# Patient Record
Sex: FEMALE | Race: WHITE | Employment: PART TIME | URBAN - METROPOLITAN AREA
[De-identification: names, ages, dates, MRNs, and addresses within clinical notes are randomized per-mention and may not be internally consistent; named-entity substitution may affect disease eponyms.]

---

## 2018-07-30 ENCOUNTER — OFFICE VISIT (OUTPATIENT)
Dept: PRIMARY CARE CLINIC | Age: 33
End: 2018-07-30

## 2018-07-30 VITALS
SYSTOLIC BLOOD PRESSURE: 124 MMHG | HEIGHT: 65 IN | TEMPERATURE: 99 F | RESPIRATION RATE: 12 BRPM | DIASTOLIC BLOOD PRESSURE: 84 MMHG | WEIGHT: 195.8 LBS | BODY MASS INDEX: 32.62 KG/M2 | HEART RATE: 86 BPM | OXYGEN SATURATION: 98 %

## 2018-07-30 DIAGNOSIS — M23.91 ACUTE INTERNAL DERANGEMENT OF RIGHT KNEE: Primary | ICD-10-CM

## 2018-07-30 PROCEDURE — 99203 OFFICE O/P NEW LOW 30 MIN: CPT | Performed by: NURSE PRACTITIONER

## 2018-07-30 PROCEDURE — G8417 CALC BMI ABV UP PARAM F/U: HCPCS | Performed by: NURSE PRACTITIONER

## 2018-07-30 PROCEDURE — 4004F PT TOBACCO SCREEN RCVD TLK: CPT | Performed by: NURSE PRACTITIONER

## 2018-07-30 PROCEDURE — G8427 DOCREV CUR MEDS BY ELIG CLIN: HCPCS | Performed by: NURSE PRACTITIONER

## 2018-07-30 RX ORDER — ALBUTEROL SULFATE 90 UG/1
2 AEROSOL, METERED RESPIRATORY (INHALATION)
COMMUNITY
End: 2018-09-11 | Stop reason: SDUPTHER

## 2018-07-30 RX ORDER — NAPROXEN 500 MG/1
500 TABLET ORAL 2 TIMES DAILY WITH MEALS
Qty: 20 TABLET | Refills: 0 | Status: SHIPPED | OUTPATIENT
Start: 2018-07-30 | End: 2018-09-11

## 2018-07-30 ASSESSMENT — PATIENT HEALTH QUESTIONNAIRE - PHQ9
SUM OF ALL RESPONSES TO PHQ QUESTIONS 1-9: 0
2. FEELING DOWN, DEPRESSED OR HOPELESS: 0
1. LITTLE INTEREST OR PLEASURE IN DOING THINGS: 0
SUM OF ALL RESPONSES TO PHQ9 QUESTIONS 1 & 2: 0

## 2018-07-30 NOTE — PROGRESS NOTES
CHIEF COMPLAINT    Chief Complaint   Patient presents with    Leg Pain     right leg pain/swelling/popping in knee x 3 days due to a fall. has taken tyl/ibu with relief.  Fall       HPI    Shannan Proctor is a 28 y.o. female who presents with Right knee pain. Mechanism of injury was She was standing on a garbage can looking at her birds and S and the can gave out. She did not land on her knee but hyperextended the knee. .  The onset of the pain was 2 days ago. The duration has been constant since the onset. The severity of the pain is 8/10. The pain worsens with ambulation. Patient also works as a  so she is up on her feet all day long. She denies hitting her head no other injuries. She's been using Tylenol and ibuprofen with minimal relief. REVIEW OF SYSTEMS    General: No fever or chills  Skin: No Rashes or redness of the skin  : No dysuria or hematuria  See HPI for further details. PAST MEDICAL & SURGICAL HISTORY    Past Medical History:   Diagnosis Date    Asthma      Past Surgical History:   Procedure Laterality Date    FOOT SURGERY Right 2010       CURRENT MEDICATIONS    Current Outpatient Prescriptions   Medication Sig Dispense Refill    Loratadine (CLARITIN PO) Take by mouth as needed (allergies)      Multiple Vitamins-Minerals (MULTIVITAMIN WOMEN PO) Take by mouth daily      Biotin w/ Vitamins C & E (HAIR/SKIN/NAILS PO) Take by mouth daily      albuterol sulfate  (90 Base) MCG/ACT inhaler Inhale 2 puffs into the lungs      naproxen (NAPROSYN) 500 MG tablet Take 1 tablet by mouth 2 times daily (with meals) for 10 days 20 tablet 0     No current facility-administered medications for this visit.         ALLERGIES    No Known Allergies    SOCIAL & FAMILY HISTORY    Social History     Social History    Marital status:      Spouse name: N/A    Number of children: N/A    Years of education: N/A     Social History Main Topics    Smoking status: Current Some Day Smoker

## 2018-07-30 NOTE — PATIENT INSTRUCTIONS
Patient Education        Knee Sprain: Care Instructions  Your Care Instructions    A knee sprain is one or more stretched, partly torn, or completely torn knee ligaments. Ligaments are bands of ropelike tissue that connect bone to bone and make the knee stable. The knee has four main ligaments. Knee sprains often happen because of a twisting or bending injury from sports such as skiing, basketball, soccer, or football. The knee turns one way while the lower or upper leg goes another way. A sprain also can happen when the knee is hit from the side or the front. If a knee ligament is slightly stretched, you will probably need only home treatment. You may need a splint or brace (immobilizer) for a partly torn ligament. A complete tear may need surgery. A minor knee sprain may take up to 6 weeks to heal, while a severe sprain may take months. Follow-up care is a key part of your treatment and safety. Be sure to make and go to all appointments, and call your doctor if you are having problems. It's also a good idea to know your test results and keep a list of the medicines you take. How can you care for yourself at home? · Follow instructions about how much weight you can put on your leg and how to walk with crutches. · Prop up your leg on a pillow when you ice it or anytime you sit or lie down for the next 3 days. Try to keep it above the level of your heart. This will help reduce swelling. · Put ice or a cold pack on your knee for 10 to 20 minutes at a time. Try to do this every 1 to 2 hours for the next 3 days (when you are awake) or until the swelling goes down. Put a thin cloth between the ice and your skin. Do not get the splint wet. · If you have an elastic bandage, make sure it is snug but not so tight that your leg is numb, tingles, or swells below the bandage. You can loosen the bandage if it is too tight. · Your doctor may recommend a brace (immobilizer) to support your knee while it heals.  Wear it

## 2018-07-31 ENCOUNTER — TELEPHONE (OUTPATIENT)
Dept: ORTHOPEDIC SURGERY | Age: 33
End: 2018-07-31

## 2018-07-31 ENCOUNTER — OFFICE VISIT (OUTPATIENT)
Dept: ORTHOPEDIC SURGERY | Age: 33
End: 2018-07-31

## 2018-07-31 VITALS
WEIGHT: 195 LBS | DIASTOLIC BLOOD PRESSURE: 81 MMHG | HEIGHT: 65 IN | BODY MASS INDEX: 32.49 KG/M2 | SYSTOLIC BLOOD PRESSURE: 126 MMHG | HEART RATE: 78 BPM

## 2018-07-31 DIAGNOSIS — M25.061 HEMARTHROSIS OF RIGHT KNEE: Primary | ICD-10-CM

## 2018-07-31 DIAGNOSIS — M25.561 ACUTE PAIN OF RIGHT KNEE: ICD-10-CM

## 2018-07-31 DIAGNOSIS — S83.411A SPRAIN OF MEDIAL COLLATERAL LIGAMENT OF RIGHT KNEE, INITIAL ENCOUNTER: ICD-10-CM

## 2018-07-31 PROCEDURE — 20610 DRAIN/INJ JOINT/BURSA W/O US: CPT | Performed by: ORTHOPAEDIC SURGERY

## 2018-07-31 PROCEDURE — G8417 CALC BMI ABV UP PARAM F/U: HCPCS | Performed by: ORTHOPAEDIC SURGERY

## 2018-07-31 PROCEDURE — 99203 OFFICE O/P NEW LOW 30 MIN: CPT | Performed by: ORTHOPAEDIC SURGERY

## 2018-07-31 PROCEDURE — 4004F PT TOBACCO SCREEN RCVD TLK: CPT | Performed by: ORTHOPAEDIC SURGERY

## 2018-07-31 PROCEDURE — G8427 DOCREV CUR MEDS BY ELIG CLIN: HCPCS | Performed by: ORTHOPAEDIC SURGERY

## 2018-07-31 NOTE — PROGRESS NOTES
INITIAL EVALUATION OF KNEE                                                                    7/31/2018  Rachelle Means     1985       History of Present Illness:    Dagoberto Matt is seen for Knee Pain (RT  Knee injury on 7/28/18)        This is a first office visit for this 79-year-old woman sent by [facility] for evaluation of right knee pain secondary to an injury in 7/28/2018. She states that she was standing on a garbage can which was on top of a table at  her mother's home to check out a bird nest. The can gave way and she fell approximately 4 feet causing hyperextension injury to her right knee. She states she  lay on the ground for about 20 minutes before attempting to ambulate. She had rapid onset of  swelling in her knee occurring over a couple of hours. She had  pain as well as clicking, tightness, and a feeling of  giving way of her knee. She is able to ambulate but with some difficulty. She states she has no pain at rest and pain up to 4 with ambulation and stair climbing. She denies any previous similar injury. Her pain is only partially relieved by taking naproxen. Pertinent items are noted in HPI  Review of systems reviewed from Patient History Form dated on 7/31/18 and available in the patient's chart under the Media tab. The patient's past medical history, medications, allergies, family history, social history, HPI have been reviewed, dated, and recorded in the chart. She is mother of 4 children. She does work part-time as a  .     /81   Pulse 78   Ht 5' 5\" (1.651 m)   Wt 195 lb (88.5 kg)   LMP 07/09/2018 (Approximate)   BMI 32.45 kg/m²     Physical examination:    General Appearance: no acute distress, alert, oriented x 3, appropriate mood and affect  Limps when walking: yes - mild to moderate,                                                                   Right Left  Swelling Mild to moderate    Effusion  mild    Ecchymosis neg    Errythemia neg    Warmth neg    Deformity neg    Crepitus mild    Patellar Subluxation neg    Tenderness  Medial joint    Log Roll neg    Patellar Apprehension neg    Patellar Grind neg    Extension Lag neg      Neurovascular Status: intact  Range of Motion Extension Flexion Pules (0-4) Dorsalis  Pedis Posterior  Tibialis   Right 0 138         Degrees Right    2+   Left   0 142         Degrees Left                           Test Including Ligamentous Stability/ Positive or Negative                                       Test          Right         Left   Valgus neg                                  Varus neg    Lachman neg    Anterior Drawer neg    Posterior Drawer neg    Krystle neg    Pivot Shift     Quadraceps Active     Atrophy                    cm                   cm       X-Ray Findings taken in Office: 4 views right knee read by myself show No evidence of fracture no evidence of joint space loss no evidence of loose body. Minimal age-related degenerative changes. Impression:   1. First degree MCL strain of right knee. 2. Hemarthrosis of right knee. Rule out occult fracture or partial tear of anterior cruciate ligament. Plan/Treatment:   1. Aspiration  was recommended and Shavon Vogt agreed. The site of aspiration was cleaned with alcohol, anesthetized with  2 of 1% Carbocaine. After waiting sufficient time for analgesia  the  Right knee was aspirated obtaining  6 ml of  Juan M blood   2. MRI scan is ordered to evaluate reason for hemarthrosis. She is agreeable to proceed. 3. Return in a week for review of her MRI scan results. Chrissy Mckenzie MD  7/31/2018    This dictation was done with Dragon dictation and may contain mechanical errors related to translation.

## 2018-07-31 NOTE — PATIENT INSTRUCTIONS
Impression:   1. First degree MCL strain of right knee. 2. Hemarthrosis of right knee. Rule out occult fracture or partial tear of anterior cruciate ligament. Plan/Treatment:   1. Aspiration  was recommended and Yelena Cooler agreed. The site of aspiration was cleaned with alcohol, anesthetized with  2 of 1% Carbocaine. After waiting sufficient time for analgesia  the  Right knee was aspirated obtaining  6 ml of  Juan M blood   2. MRI scan is ordered to evaluate reason for hemarthrosis. She is agreeable to proceed. 3. Return in a week for review of her MRI scan results.     Jeff Saldivar MD  7/31/2018

## 2018-08-09 ENCOUNTER — HOSPITAL ENCOUNTER (OUTPATIENT)
Dept: MRI IMAGING | Age: 33
Discharge: HOME | End: 2018-08-09
Attending: ORTHOPAEDIC SURGERY | Admitting: ORTHOPAEDIC SURGERY

## 2018-08-09 DIAGNOSIS — S83.411A SPRAIN OF MEDIAL COLLATERAL LIGAMENT OF RIGHT KNEE, INITIAL ENCOUNTER: ICD-10-CM

## 2018-08-09 DIAGNOSIS — M25.561 ACUTE PAIN OF RIGHT KNEE: ICD-10-CM

## 2018-08-09 DIAGNOSIS — M25.061 HEMARTHROSIS OF RIGHT KNEE: ICD-10-CM

## 2018-08-10 ENCOUNTER — OFFICE VISIT (OUTPATIENT)
Dept: ORTHOPEDIC SURGERY | Age: 33
End: 2018-08-10

## 2018-08-10 VITALS
BODY MASS INDEX: 32.49 KG/M2 | HEART RATE: 84 BPM | DIASTOLIC BLOOD PRESSURE: 70 MMHG | WEIGHT: 195 LBS | HEIGHT: 65 IN | SYSTOLIC BLOOD PRESSURE: 116 MMHG

## 2018-08-10 DIAGNOSIS — S82.141A CLOSED FRACTURE OF RIGHT TIBIAL PLATEAU, INITIAL ENCOUNTER: Primary | ICD-10-CM

## 2018-08-10 PROCEDURE — G8417 CALC BMI ABV UP PARAM F/U: HCPCS | Performed by: ORTHOPAEDIC SURGERY

## 2018-08-10 PROCEDURE — 99213 OFFICE O/P EST LOW 20 MIN: CPT | Performed by: ORTHOPAEDIC SURGERY

## 2018-08-10 PROCEDURE — 4004F PT TOBACCO SCREEN RCVD TLK: CPT | Performed by: ORTHOPAEDIC SURGERY

## 2018-08-10 PROCEDURE — G8427 DOCREV CUR MEDS BY ELIG CLIN: HCPCS | Performed by: ORTHOPAEDIC SURGERY

## 2018-08-10 NOTE — PROGRESS NOTES
Status:  intact    Range of Motion Extension Flexion Pules (0-4) Dorsalis  Pedis Posterior  Tibialis   Right 0    137      Degrees Right       Left   0     142     Degrees Left         Test Including Ligamentous Stability/ Positive or Negative                                       Test          Right         Left   Valgus neg    Varus   neg                              Lachman neg    Anterior Drawer neg    Posterior Drawer neg    Krystle     Pivot Shift     Quadraceps Active     Atrophy           MRI Findings: of right knee done on 8/9/18 was reviewed and shows  A nondisplaced fracture of the right lateral tibial plateau. There is no evidence of ligamentous injury or tear of the cruciate or collateral ligaments. No evidence of tendon tear including the infrapatellar tendon or biceps femoris tendon. Impression:   1. Nondisplaced right lateral tibial plateau fracture. Plan/Treatment:   1. She will continue ambulatory but remains restricted from over vigorous activities or sporting activities  2. She is placed on a home exercise program and was given an illustrated brochure for range of motion and maintaining strength. 3. She was told that he should continue subside in her left knee function return to normal within 4 weeks. 4. She'll return to the office if she does not return to normal within 4 weeks. Chris Vines MD  8/10/2018    This dictation was done with Dragon dictation and may contain mechanical errors related to translation.

## 2018-08-10 NOTE — PATIENT INSTRUCTIONS
Impression:   1. Nondisplaced right lateral tibial plateau fracture. Plan/Treatment:   1. She will continue ambulatory but remains restricted from over vigorous activities or sporting activities  2. She is placed on a home exercise program and was given an illustrated brochure for range of motion and maintaining strength. 3. She was told that he should continue subside in her left knee function return to normal within 4 weeks. 4. She'll return to the office if she does not return to normal within 4 weeks.     Mariana Arguello MD  8/10/2018

## 2018-09-11 ENCOUNTER — OFFICE VISIT (OUTPATIENT)
Dept: FAMILY MEDICINE CLINIC | Age: 33
End: 2018-09-11

## 2018-09-11 VITALS
HEIGHT: 65 IN | SYSTOLIC BLOOD PRESSURE: 110 MMHG | WEIGHT: 194 LBS | TEMPERATURE: 98.7 F | DIASTOLIC BLOOD PRESSURE: 72 MMHG | BODY MASS INDEX: 32.32 KG/M2

## 2018-09-11 DIAGNOSIS — J45.20 MILD INTERMITTENT ASTHMA WITHOUT COMPLICATION: Primary | ICD-10-CM

## 2018-09-11 DIAGNOSIS — Z23 FLU VACCINE NEED: ICD-10-CM

## 2018-09-11 PROCEDURE — G8427 DOCREV CUR MEDS BY ELIG CLIN: HCPCS | Performed by: INTERNAL MEDICINE

## 2018-09-11 PROCEDURE — 99203 OFFICE O/P NEW LOW 30 MIN: CPT | Performed by: INTERNAL MEDICINE

## 2018-09-11 PROCEDURE — 90471 IMMUNIZATION ADMIN: CPT | Performed by: INTERNAL MEDICINE

## 2018-09-11 PROCEDURE — 4004F PT TOBACCO SCREEN RCVD TLK: CPT | Performed by: INTERNAL MEDICINE

## 2018-09-11 PROCEDURE — 90686 IIV4 VACC NO PRSV 0.5 ML IM: CPT | Performed by: INTERNAL MEDICINE

## 2018-09-11 PROCEDURE — G8417 CALC BMI ABV UP PARAM F/U: HCPCS | Performed by: INTERNAL MEDICINE

## 2018-09-11 RX ORDER — ALBUTEROL SULFATE 90 UG/1
2 AEROSOL, METERED RESPIRATORY (INHALATION) EVERY 6 HOURS PRN
Qty: 1 INHALER | Refills: 5 | Status: SHIPPED | OUTPATIENT
Start: 2018-09-11 | End: 2020-01-21

## 2018-09-11 ASSESSMENT — ENCOUNTER SYMPTOMS
SHORTNESS OF BREATH: 0
DIARRHEA: 0
SORE THROAT: 0
BLOOD IN STOOL: 0
WHEEZING: 0
NAUSEA: 0
ABDOMINAL PAIN: 0
VOMITING: 0
APNEA: 0
RHINORRHEA: 0
CONSTIPATION: 0
SINUS PRESSURE: 0

## 2018-09-11 NOTE — PATIENT INSTRUCTIONS
Thank you for choosing [facility]. Please bring a current list of medications to every appointment. Please contact your pharmacy for any prescription refill(s) that you are requesting. Cigarette Smoking and Its Health Risks   GENERAL INFORMATION:   Smoking and your health: Cigarette smoking is the most preventable cause of illness and death in the [place]. A large number of Americans smoke cigarettes, and each year more than one million children and adults start smoking cigarettes. Many people die every year from illnesses caused by smoking. People who smoke die earlier than those who do not smoke. The risk of disease increases if you smoke a lot, inhale deeply, or have smoked many years. Why are cigarettes bad for you? Cigarettes are filled with poison that goes into the lungs when you inhale. Coughing, dizziness, and burning of the eyes, nose, and throat are early signs that smoking is harming you. Smoking increases your health risks if you have diabetes, high blood pressure, or high blood cholesterol. The long-term problems of smoking cigarettes are the following:   Cancer: Smoking increases your chances of getting cancer. Cigarette smoking may play a role in developing many kinds of cancer. Lung cancer is the most common kind of cancer caused by smoking. A smoker is at greater risk of getting cancer of the lips, mouth, throat, or voice box. Smokers also have a higher risk of getting esophagus, stomach, kidney, pancreas, cervix, bladder, and skin cancer. Heart and blood vessel disease: If you already have heart or blood vessel problems and smoke, you are at even greater risk of having continued or worse health problems. The nicotine in the tobacco causes an increase in your heart rate and blood pressure. The arteries (blood vessels) in your arms and legs tighten and narrow because of the nicotine in cigarette smoke.  Cigarette smoke increases blood clotting, and may damage the lining of your heart's arteries and other blood vessels. Carbon monoxide is a harmful gas that gets into the blood and decreases oxygen going to the heart and the body. Cigarette smoke contains this gas. Hardening of the arteries happens more often in smokers than in nonsmokers. This may make it more likely for you to have a stroke (blood clot in your brain). The more cigarettes you smoke, the greater your risk of a heart attack. Lung disease: The younger you are when you start smoking, the greater your risk of getting lung diseases. Many smokers have a cough which is caused by the chemicals in smoke. These chemicals harm the cilia (tiny hairs) that line the lungs and help remove dirt and waste products. Depending upon how much you smoke, your lungs become gray and \"dirty\" (they look like charcoal). Healthy lungs are pink. Chronic bronchitis is a serious lung infection which is often caused by smoking. Emphysema is a long-term lung disease that may be caused by smoking cigarettes. Cigarette smoking also makes asthma worse. You are at a higher risk of getting colds, pneumonia, and other lung infections if you smoke. Gastrointestinal disease: Cigarette smoking increases the amount of acid that is made by your stomach, and may cause a peptic ulcer. A peptic ulcer is an open sore in the stomach or duodenum (part of the intestine). You may also get gastroesophageal reflux from smoking. This is when you have a backflow of stomach acid into your esophagus (food tube). Other problems: The following are other problems that smoking may cause: Bad breath. Bad smell in your clothes, hair, and skin. Decreased ability to play sports or do physical activities because of breathing problems. Earlier than normal wrinkling of the skin, usually the face. Higher risk of bone fractures, such as hip, wrist, or spine. Higher risk of starting a fire.  This may happen if you fall asleep with a lit

## 2018-09-11 NOTE — PROGRESS NOTES
Subjective:      Patient ID: Laurie Gil is a 28 y.o. female. HPI   Chief Complaint   Patient presents with   Irma Masters Doctor     patient denies any complaints at this time and request Rx refill: Albuterol Inhaler     Laurie Gil is a 28 y.o. 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 Allergies: Patient has no known allergies.   Past Medical History:   Diagnosis Date    Asthma      Past Surgical History:   Procedure Laterality Date    FOOT SURGERY Right 2010     Family History   Problem Relation Age of Onset    Breast Cancer Mother 54    High Blood Pressure Father     No Known Problems Sister     No Known Problems Brother     No Known Problems Maternal Grandmother     COPD Maternal Grandfather     High Blood Pressure Maternal Grandfather     Arthritis Paternal Grandmother     Alzheimer's Disease Paternal Grandfather     No Known Problems Brother      Social History   Substance Use Topics    Smoking status: Current Some Day Smoker     Packs/day: 0.25     Years: 2.00     Types: Cigarettes     Start date: 7/1/2010    Smokeless tobacco: Never Used      Comment: QUADV, 3 YRS AND OLDER, IM, PF, PREFILL SYR OR SDV, 0.5ML (FLUZONE QUADV, PF)         Plan:      Outpatient Encounter Prescriptions as of 9/11/2018   Medication Sig Dispense Refill    albuterol sulfate  (90 Base) MCG/ACT inhaler Inhale 2 puffs into the lungs every 6 hours as needed for Wheezing 1 Inhaler 5    Loratadine (CLARITIN PO) Take by mouth as needed (allergies)      Multiple Vitamins-Minerals (MULTIVITAMIN WOMEN PO) Take by mouth daily      Biotin w/ Vitamins C & E (HAIR/SKIN/NAILS PO) Take by mouth daily      [DISCONTINUED] albuterol sulfate  (90 Base) MCG/ACT inhaler Inhale 2 puffs into the lungs      [DISCONTINUED] naproxen (NAPROSYN) 500 MG tablet Take 1 tablet by mouth 2 times daily (with meals) for 10 days 20 tablet 0     No facility-administered encounter medications on file as of 9/11/2018.       Orders Placed This Encounter   Procedures    INFLUENZA, QUADV, 3 YRS AND OLDER, IM, PF, PREFILL SYR OR SDV, 0.5ML (FLUZONE QUADV, PF)           Kirill WINSLOW DO

## 2019-05-09 ENCOUNTER — OFFICE VISIT (OUTPATIENT)
Dept: FAMILY MEDICINE CLINIC | Age: 34
End: 2019-05-09
Payer: COMMERCIAL

## 2019-05-09 VITALS
TEMPERATURE: 98.5 F | WEIGHT: 200.8 LBS | BODY MASS INDEX: 33.45 KG/M2 | SYSTOLIC BLOOD PRESSURE: 110 MMHG | HEIGHT: 65 IN | DIASTOLIC BLOOD PRESSURE: 68 MMHG

## 2019-05-09 DIAGNOSIS — B35.3 TINEA PEDIS OF BOTH FEET: Primary | ICD-10-CM

## 2019-05-09 PROCEDURE — 4004F PT TOBACCO SCREEN RCVD TLK: CPT | Performed by: FAMILY MEDICINE

## 2019-05-09 PROCEDURE — 99213 OFFICE O/P EST LOW 20 MIN: CPT | Performed by: FAMILY MEDICINE

## 2019-05-09 PROCEDURE — G8427 DOCREV CUR MEDS BY ELIG CLIN: HCPCS | Performed by: FAMILY MEDICINE

## 2019-05-09 PROCEDURE — G8417 CALC BMI ABV UP PARAM F/U: HCPCS | Performed by: FAMILY MEDICINE

## 2019-05-09 ASSESSMENT — PATIENT HEALTH QUESTIONNAIRE - PHQ9
SUM OF ALL RESPONSES TO PHQ QUESTIONS 1-9: 0
SUM OF ALL RESPONSES TO PHQ QUESTIONS 1-9: 0
2. FEELING DOWN, DEPRESSED OR HOPELESS: 0
1. LITTLE INTEREST OR PLEASURE IN DOING THINGS: 0
SUM OF ALL RESPONSES TO PHQ9 QUESTIONS 1 & 2: 0

## 2019-05-09 ASSESSMENT — ENCOUNTER SYMPTOMS
GASTROINTESTINAL NEGATIVE: 1
EYES NEGATIVE: 1
RESPIRATORY NEGATIVE: 1

## 2019-05-09 NOTE — PROGRESS NOTES
Subjective:      Patient ID: Tre Davidson is a 27 y.o. female. Chief Complaint   Patient presents with    Tinea Pedis     both feet. HPI33 yr old female presenting with chief complaint of athletes feet that has been occurring off and on for years. Both feet itch and peel. She has used OTC anti-fungal spray and Lysol's her shower daily. Review of Systems   Constitutional: Negative. HENT: Negative. Eyes: Negative. Respiratory: Negative. Cardiovascular: Negative. Gastrointestinal: Negative. Endocrine: Negative. Genitourinary: Negative. Musculoskeletal: Negative. Skin:        + itching bilateral feet, between toes and entire bottom of foot   Neurological: Negative. Psychiatric/Behavioral: Negative. Objective:   Physical Exam   Constitutional: She is oriented to person, place, and time. She appears well-developed and well-nourished. HENT:   Head: Normocephalic. Eyes: Conjunctivae are normal.   Neck: Normal range of motion. Cardiovascular: Normal rate, regular rhythm, normal heart sounds and intact distal pulses. Pulmonary/Chest: Effort normal and breath sounds normal.   Abdominal: Soft. Bowel sounds are normal.   Musculoskeletal: Normal range of motion. Neurological: She is alert and oriented to person, place, and time. Skin:   + areas of confluent thickening and blister like lesions on the plantar surface of bilateral feet.  + Scaling/peeling on lateral sides of bilateral feet. Psychiatric: She has a normal mood and affect. Her behavior is normal. Thought content normal.       Assessment:       Diagnosis Orders   1. Tinea pedis of both feet             Plan:     Discussed the followin. Tx with OTC fungal spray for 4 weeks and keep socks on all of the time. 2. Lysol or treat shoes before use   3. Fungal spray is better than the cream. It covers more area.    4. If after 4 weeks symptoms are not better follow up in office and we will look into the

## 2019-05-09 NOTE — PATIENT INSTRUCTIONS
1. Tx with OTC fungal spray for 4 weeks and keep socks on all of the time. 2. Lysol or treat shoes before use  3. Fungal spray is better than the cream. It covers more area. 4. If after 4 weeks symptoms are not better follow up in office and we will look into the next step.

## 2020-01-21 RX ORDER — ALBUTEROL SULFATE 90 UG/1
AEROSOL, METERED RESPIRATORY (INHALATION)
Qty: 8.5 INHALER | Refills: 5 | Status: SHIPPED | OUTPATIENT
Start: 2020-01-21 | End: 2022-01-11 | Stop reason: SDUPTHER

## 2022-01-11 RX ORDER — ALBUTEROL SULFATE 90 UG/1
AEROSOL, METERED RESPIRATORY (INHALATION)
Qty: 1 EACH | Refills: 1 | Status: SHIPPED | OUTPATIENT
Start: 2022-01-11

## 2022-01-11 NOTE — TELEPHONE ENCOUNTER
----- Message from Sheyla Will sent at 1/11/2022  2:39 PM EST -----  Subject: Refill Request    QUESTIONS  Name of Medication? albuterol sulfate  (90 Base) MCG/ACT inhaler  Patient-reported dosage and instructions? depends on how she is feeling,   couple times a week  How many days do you have left? 0  Preferred Pharmacy? [id number]  Pharmacy phone number (if available)? [phone]  Additional Information for Provider? would like a refill due to pharmacy   states that she has to get it from her PCP, can be reached at [phone]   ok to leave a message  ---------------------------------------------------------------------------  --------------  [address]  What is the best way for the office to contact you? OK to leave message on   voicemail  Preferred Call Back Phone Number?  [phone]

## 2022-02-01 ENCOUNTER — OFFICE VISIT (OUTPATIENT)
Dept: FAMILY MEDICINE CLINIC | Age: 37
End: 2022-02-01
Payer: COMMERCIAL

## 2022-02-01 VITALS
DIASTOLIC BLOOD PRESSURE: 68 MMHG | BODY MASS INDEX: 32.36 KG/M2 | SYSTOLIC BLOOD PRESSURE: 118 MMHG | HEIGHT: 65 IN | TEMPERATURE: 98.2 F | WEIGHT: 194.2 LBS

## 2022-02-01 DIAGNOSIS — J45.20 MILD INTERMITTENT ASTHMA WITHOUT COMPLICATION: ICD-10-CM

## 2022-02-01 DIAGNOSIS — Z13.1 SCREENING FOR DIABETES MELLITUS: ICD-10-CM

## 2022-02-01 DIAGNOSIS — Z13.220 SCREENING FOR LIPID DISORDERS: ICD-10-CM

## 2022-02-01 DIAGNOSIS — Z00.00 PHYSICAL EXAM: Primary | ICD-10-CM

## 2022-02-01 PROCEDURE — 99395 PREV VISIT EST AGE 18-39: CPT | Performed by: INTERNAL MEDICINE

## 2022-02-01 PROCEDURE — G8484 FLU IMMUNIZE NO ADMIN: HCPCS | Performed by: INTERNAL MEDICINE

## 2022-02-01 SDOH — ECONOMIC STABILITY: FOOD INSECURITY: WITHIN THE PAST 12 MONTHS, YOU WORRIED THAT YOUR FOOD WOULD RUN OUT BEFORE YOU GOT MONEY TO BUY MORE.: NEVER TRUE

## 2022-02-01 SDOH — ECONOMIC STABILITY: FOOD INSECURITY: WITHIN THE PAST 12 MONTHS, THE FOOD YOU BOUGHT JUST DIDN'T LAST AND YOU DIDN'T HAVE MONEY TO GET MORE.: NEVER TRUE

## 2022-02-01 ASSESSMENT — SOCIAL DETERMINANTS OF HEALTH (SDOH): HOW HARD IS IT FOR YOU TO PAY FOR THE VERY BASICS LIKE FOOD, HOUSING, MEDICAL CARE, AND HEATING?: NOT HARD AT ALL

## 2022-02-01 ASSESSMENT — ENCOUNTER SYMPTOMS
NAUSEA: 0
COUGH: 0
VOMITING: 0
SINUS PRESSURE: 0
ABDOMINAL PAIN: 0
BLOOD IN STOOL: 0
SHORTNESS OF BREATH: 0
SORE THROAT: 0
WHEEZING: 0
APNEA: 0
SINUS PAIN: 0
RHINORRHEA: 0
DIARRHEA: 0
CONSTIPATION: 0

## 2022-02-01 ASSESSMENT — PATIENT HEALTH QUESTIONNAIRE - PHQ9
1. LITTLE INTEREST OR PLEASURE IN DOING THINGS: 0
SUM OF ALL RESPONSES TO PHQ QUESTIONS 1-9: 0
SUM OF ALL RESPONSES TO PHQ9 QUESTIONS 1 & 2: 0
2. FEELING DOWN, DEPRESSED OR HOPELESS: 0
SUM OF ALL RESPONSES TO PHQ QUESTIONS 1-9: 0

## 2022-02-01 NOTE — PROGRESS NOTES
Immunization History   Administered Date(s) Administered    BCG (Talib BCG) 1984, 02/14/1987, 01/23/1989, 08/30/1991    DTaP 1985, 1985, 1984, 02/19/1988, 07/10/1988    Hib, unspecified 02/14/1986    Influenza Virus Vaccine 09/27/2015, 12/27/2016    Influenza, Vikas Reddish, IM, PF (6 mo and older Fluzone, Flulaval, Fluarix, and 3 yrs and older Afluria) 09/11/2018    MMR 03/07/1990    Measles 04/19/1985    Mumps 09/22/1986    Pneumococcal Polysaccharide ([id number]) 09/27/2015    Polio OPV 1984, 1984, 07/10/1985, 02/19/1988    Rubella 04/19/1985    Td, unspecified formulation 05/24/1996    Tdap (Boostrix, Adacel) 07/20/2015

## 2022-02-01 NOTE — PATIENT INSTRUCTIONS
Thank you for choosing [facility]. Please bring a current list of medications to every appointment. Please contact your pharmacy for any prescription refill(s) that you are requesting. Cigarette Smoking and Its Health Risks   GENERAL INFORMATION:   Smoking and your health: Cigarette smoking is the most preventable cause of illness and death in [facility]. A large number of Americans smoke cigarettes, and each year more than one million children and adults start smoking cigarettes. Many people die every year from illnesses caused by smoking. People who smoke die earlier than those who do not smoke. The risk of disease increases if you smoke a lot, inhale deeply, or have smoked many years. Why are cigarettes bad for you? Cigarettes are filled with poison that goes into the lungs when you inhale. Coughing, dizziness, and burning of the eyes, nose, and throat are early signs that smoking is harming you. Smoking increases your health risks if you have diabetes, high blood pressure, or high blood cholesterol. The long-term problems of smoking cigarettes are the following:   Cancer: Smoking increases your chances of getting cancer. Cigarette smoking may play a role in developing many kinds of cancer. Lung cancer is the most common kind of cancer caused by smoking. A smoker is at greater risk of getting cancer of the lips, mouth, throat, or voice box. Smokers also have a higher risk of getting esophagus, stomach, kidney, pancreas, cervix, bladder, and skin cancer. Heart and blood vessel disease: If you already have heart or blood vessel problems and smoke, you are at even greater risk of having continued or worse health problems. The nicotine in the tobacco causes an increase in your heart rate and blood pressure. The arteries (blood vessels) in your arms and legs tighten and narrow because of the nicotine in cigarette smoke.  Cigarette smoke increases blood clotting, and may damage the chance of getting cancer will be reduced as compared to a person who does not quit. As a former smoker, you will live longer than people who continue to smoke. Women who quit smoking before getting pregnant have a better chance of having a healthy baby. You will decrease the health risks of nonsmokers if you stop smoking. By stopping smoking you will also save money. What is the best way to stop smoking? A large percentage of people have tried to quit smoking at least once. Most people who try to quit smoking go through a series of stages. Following are the stages you may go through to stop smoking: Thinking about quitting. Deciding to quit on a certain day. Quitting smoking. Successfully staying an ex-smoker. You must be strong in order to quit smoking. When you decide to quit, you can get help from your caregiver or others. You will learn that there are many ways to stop smoking. Talk to your caregiver about the best method for you when you are ready to quit smoking. Ask your caregiver for more information about how to stop smoking. Call or write the following for more information about the risks of smoking. Smokefree. gov  Phone: 0-883.826.8929  Web Address: www.smokefree. gov  American [employer] Association  [facility]. [address]  Phone: 6-4-389.622.5433  Phone: 2-1-503--532-2945  Web Address: [employer]. [address] [place] [address]  Phone: 9-143.596.2245  Web Address: http://moonCamperoo/. gov   CARE AGREEMENT:   You have the right to help plan your care. To help with this plan, you must learn about your health condition and how it may be treated. You can then discuss treatment options with your caregivers. Work with them to decide what care may be used to treat you. You always have the right to refuse treatment. Copyright © 2009. [employer] All rights reserved.  Information is for End User's use only and may not be sold, redistributed or otherwise used for commercial purposes. The above information is an  only. It is not intended as medical advice for individual conditions or treatments. Talk to your doctor, nurse or pharmacist before following any medical regimen to see if it is safe and effective for you.

## 2022-02-01 NOTE — PROGRESS NOTES
Start date: 7/1/2010    Smokeless tobacco: Never Used    Tobacco comment: social - 10 cigarettes on weekends   Substance Use Topics    Alcohol use: Yes     Comment: social use     Patient Active Problem List   Diagnosis    Asthma    Mild intermittent asthma with acute exacerbation    Neoplasm of unspecified nature of bone, soft tissue, and skin    Sprain of medial collateral ligament of right knee    Hemarthrosis of right knee    Closed fracture of right tibial plateau       Review of Systems   Constitutional: Negative for chills, diaphoresis, fatigue and fever. HENT: Negative for congestion, postnasal drip, rhinorrhea, sinus pressure, sinus pain and sore throat. Eyes: Negative for visual disturbance. Respiratory: Negative for apnea, cough, shortness of breath and wheezing. Cardiovascular: Negative for chest pain and palpitations. Gastrointestinal: Negative for abdominal pain, blood in stool, constipation, diarrhea, nausea and vomiting. Endocrine: Negative for polyuria. Genitourinary: Negative for dysuria, frequency, hematuria and urgency. Musculoskeletal: Negative for arthralgias and myalgias. Skin: Negative for rash. Neurological: Negative for dizziness, syncope, weakness and numbness. Hematological: Negative for adenopathy. Prior to Visit Medications    Medication Sig Taking?  Authorizing Provider   albuterol sulfate  (90 Base) MCG/ACT inhaler 1INHALE 2 PUFFS INTO MOUTH EVERY 6 HOURS AS NEEDED FOR WHEEZING  Guilherme Jesus, DO   Loratadine (CLARITIN PO) Take by mouth as needed (allergies)  Historical Provider, MD   Multiple Vitamins-Minerals (MULTIVITAMIN WOMEN PO) Take by mouth daily  Historical Provider, MD   Biotin w/ Vitamins C & E (HAIR/SKIN/NAILS PO) Take by mouth daily  Historical Provider, MD        No Known Allergies    Past Medical History:   Diagnosis Date    Asthma        Past Surgical History:   Procedure Laterality Date    FOOT SURGERY Right 2010 Social History     Socioeconomic History    Marital status:      Spouse name: Not on file    Number of children: Not on file    Years of education: Not on file    Highest education level: Not on file   Occupational History    Occupation:    Tobacco Use    Smoking status: Current Some Day Smoker     Packs/day: 0.25     Years: 2.00     Pack years: 0.50     Types: Cigarettes     Start date: 7/1/2010    Smokeless tobacco: Never Used    Tobacco comment: social - 10 cigarettes on weekends   Vaping Use    Vaping Use: Never used   Substance and Sexual Activity    Alcohol use: Yes     Comment: social use    Drug use: No    Sexual activity: Yes     Partners: Male   Other Topics Concern    Not on file   Social History Narrative    Not on file     Social Determinants of Health     Financial Resource Strain: Low Risk     Difficulty of Paying Living Expenses: Not hard at all   Food Insecurity: No Food Insecurity    Worried About Running Out of Food in the Last Year: Never true    Miky of Food in the Last Year: Never true   Transportation Needs:     Lack of Transportation (Medical): Not on file    Lack of Transportation (Non-Medical):  Not on file   Physical Activity:     Days of Exercise per Week: Not on file    Minutes of Exercise per Session: Not on file   Stress:     Feeling of Stress : Not on file   Social Connections:     Frequency of Communication with Friends and Family: Not on file    Frequency of Social Gatherings with Friends and Family: Not on file    Attends Synagogue Services: Not on file    Active Member of Clubs or Organizations: Not on file    Attends Club or Organization Meetings: Not on file    Marital Status: Not on file   Intimate Partner Violence:     Fear of Current or Ex-Partner: Not on file    Emotionally Abused: Not on file    Physically Abused: Not on file    Sexually Abused: Not on file   Housing Stability:     Unable to Pay for Housing in the Last Year: Not on file    Number of Places Lived in the Last Year: Not on file    Unstable Housing in the Last Year: Not on file        Family History   Problem Relation Age of Onset    Breast Cancer Mother 54    High Blood Pressure Father     No Known Problems Sister     No Known Problems Brother     No Known Problems Maternal Grandmother     COPD Maternal Grandfather     High Blood Pressure Maternal Grandfather     Arthritis Paternal Grandmother     Alzheimer's Disease Paternal Grandfather     No Known Problems Brother        ADVANCE DIRECTIVE: N, <no information>    Vitals:    02/01/22 1318   Temp: 98.2 °F (36.8 °C)   TempSrc: Temporal   Weight: 194 lb 3.2 oz (88.1 kg)   Height: 5' 5\" (1.651 m)     Estimated body mass index is 32.32 kg/m² as calculated from the following:    Height as of this encounter: 5' 5\" (1.651 m). Weight as of this encounter: 194 lb 3.2 oz (88.1 kg). Physical Exam  Vitals and nursing note reviewed. Constitutional:       Appearance: Normal appearance. HENT:      Head: Normocephalic and atraumatic. Right Ear: Tympanic membrane, ear canal and external ear normal.      Left Ear: Tympanic membrane, ear canal and external ear normal.   Eyes:      General:         Right eye: No discharge. Left eye: No discharge. Extraocular Movements: Extraocular movements intact. Pupils: Pupils are equal, round, and reactive to light. Cardiovascular:      Rate and Rhythm: Normal rate and regular rhythm. Heart sounds: No murmur heard. Pulmonary:      Effort: Pulmonary effort is normal. No respiratory distress. Breath sounds: Normal breath sounds. No stridor. No wheezing or rhonchi. Abdominal:      General: There is no distension. Palpations: There is no mass. Tenderness: There is no abdominal tenderness. There is no rebound. Musculoskeletal:         General: No swelling or deformity. Cervical back: No rigidity.    Lymphadenopathy: Cervical: No cervical adenopathy. Skin:     Coloration: Skin is not jaundiced. Findings: No erythema. Neurological:      General: No focal deficit present. Mental Status: She is alert and oriented to person, place, and time. Cranial Nerves: No cranial nerve deficit. Motor: No weakness. Psychiatric:         Mood and Affect: Mood normal.         Behavior: Behavior normal.         Thought Content: Thought content normal.         Judgment: Judgment normal.         No flowsheet data found. No results found for: CHOL, CHOLFAST, TRIG, TRIGLYCFAST, HDL, LDLCHOLESTEROL, LDLCALC, GLUF, GLUCOSE, LABA1C    The ASCVD Risk score (Jessi Alba, et al., 2013) failed to calculate for the following reasons:     The 2013 ASCVD risk score is only valid for ages 36 to 78    Immunization History   Administered Date(s) Administered    BCG ([place] BCG) 1984, 02/14/1987, 01/23/1989, 08/30/1991    DTaP 1984, 1984, 1984, 02/19/1988, 07/10/1988    Hib, unspecified 02/14/1986    Influenza Virus Vaccine 09/27/2015, 12/27/2016    Influenza, Quadv, IM, PF (6 mo and older Fluzone, Flulaval, Fluarix, and 3 yrs and older Afluria) 09/11/2018    MMR 03/07/1990    Measles 04/19/1985    Mumps 09/22/1986    Pneumococcal Polysaccharide ([id number]) 09/27/2015    Polio OPV 1984, 1984, 07/10/1985, 02/19/1988    Rubella 04/19/1985    Td, unspecified formulation 05/24/1996    Tdap (Boostrix, Adacel) 07/20/2015       Health Maintenance   Topic Date Due    Hepatitis C screen  Never done    Varicella vaccine (1 of 2 - 2-dose childhood series) Never done    COVID-19 Vaccine (1) Never done    Depression Screen  Never done    HIV screen  Never done    Cervical cancer screen  Never done    Flu vaccine (1) 09/01/2021    DTaP/Tdap/Td vaccine (7 - Td or Tdap) 07/07/2027    Pneumococcal 0-64 years Vaccine (2 of 2 - PPSV23) 01/12/2049    Hib vaccine  Completed    Hepatitis A vaccine Aged Out    Hepatitis B vaccine  Aged Out    Meningococcal (ACWY) vaccine  Aged Out          ASSESSMENT/PLAN:  1. Screening for lipid disorders  2. Screening for diabetes mellitus     Diagnosis Orders   1. Physical exam     2. Screening for lipid disorders  Lipid Panel   3. Screening for diabetes mellitus  Glucose, Fasting   4. Mild intermittent asthma without complication       Outpatient Encounter Medications as of 2/1/2022   Medication Sig Dispense Refill    albuterol sulfate  (90 Base) MCG/ACT inhaler 1INHALE 2 PUFFS INTO MOUTH EVERY 6 HOURS AS NEEDED FOR WHEEZING 1 each 1    Loratadine (CLARITIN PO) Take by mouth as needed (allergies)      Multiple Vitamins-Minerals (MULTIVITAMIN WOMEN PO) Take by mouth daily      Biotin w/ Vitamins C & E (HAIR/SKIN/NAILS PO) Take by mouth daily       No facility-administered encounter medications on file as of 2/1/2022. Orders Placed This Encounter   Procedures    Lipid Panel     Standing Status:   Future     Standing Expiration Date:   2/1/2023     Order Specific Question:   Is Patient Fasting?/# of Hours     Answer:   12    Glucose, Fasting     Standing Status:   Future     Standing Expiration Date:   2/1/2023       No follow-ups on file.        An electronic signature was used to authenticate this note.    --Lila Kohli DO on 2/1/2022 at 1:23 PM

## 2022-02-15 DIAGNOSIS — Z13.1 SCREENING FOR DIABETES MELLITUS: ICD-10-CM

## 2022-02-15 DIAGNOSIS — Z13.220 SCREENING FOR LIPID DISORDERS: ICD-10-CM

## 2022-02-15 LAB
CHOLESTEROL, TOTAL: 154 MG/DL (ref 0–199)
GLUCOSE FASTING: 89 MG/DL (ref 70–99)
HDLC SERPL-MCNC: 55 MG/DL (ref 40–60)
LDL CHOLESTEROL CALCULATED: 59 MG/DL
TRIGL SERPL-MCNC: 198 MG/DL (ref 0–150)
VLDLC SERPL CALC-MCNC: 40 MG/DL